# Patient Record
Sex: MALE | Race: WHITE | NOT HISPANIC OR LATINO | URBAN - METROPOLITAN AREA
[De-identification: names, ages, dates, MRNs, and addresses within clinical notes are randomized per-mention and may not be internally consistent; named-entity substitution may affect disease eponyms.]

---

## 2020-06-17 ENCOUNTER — OFFICE VISIT (OUTPATIENT)
Dept: URBAN - METROPOLITAN AREA CLINIC 35 | Facility: CLINIC | Age: 45
End: 2020-06-17

## 2020-06-17 ENCOUNTER — TELEPHONE ENCOUNTER (OUTPATIENT)
Dept: URBAN - METROPOLITAN AREA CLINIC 35 | Facility: CLINIC | Age: 45
End: 2020-06-17

## 2020-06-17 VITALS
DIASTOLIC BLOOD PRESSURE: 80 MMHG | SYSTOLIC BLOOD PRESSURE: 106 MMHG | WEIGHT: 175 LBS | TEMPERATURE: 98.6 F | BODY MASS INDEX: 23.19 KG/M2 | HEIGHT: 73 IN

## 2020-06-17 RX ORDER — INSULIN HUMAN 100 [IU]/ML
AS DIRECTED INJECTION, SUSPENSION SUBCUTANEOUS
Status: ACTIVE | COMMUNITY

## 2020-06-17 RX ORDER — FAMOTIDINE 20 MG/1
1 TABLET AT BEDTIME AS NEEDED TABLET, FILM COATED ORAL ONCE A DAY
Qty: 90 TABLET | Refills: 1 | Status: ACTIVE | COMMUNITY
Start: 2020-05-20

## 2020-06-17 RX ORDER — FAMOTIDINE 40 MG/1
1 TABLET AT BEDTIME TABLET, FILM COATED ORAL ONCE A DAY
Qty: 30 | Status: DISCONTINUED | COMMUNITY

## 2020-06-17 RX ORDER — ESOMEPRAZOLE MAGNESIUM 20 MG/1
1 CAPSULE CAPSULE, DELAYED RELEASE ORAL ONCE A DAY
Status: ACTIVE | COMMUNITY

## 2020-06-17 RX ORDER — SUCRALFATE 1 G/1
1 TABLET ON AN EMPTY STOMACH TABLET ORAL TWICE A DAY
Qty: 60 | Status: DISCONTINUED | COMMUNITY

## 2020-06-17 RX ORDER — ESOMEPRAZOLE MAGNESIUM 20 MG/1
1 PACKET 1 HOUR BEFORE A MEAL MIXED WITH 15 ML OF WATER GRANULE, DELAYED RELEASE ORAL ONCE A DAY
Qty: 90 | Refills: 1 | Status: DISCONTINUED | COMMUNITY
Start: 2020-05-20

## 2020-06-17 RX ORDER — LISINOPRIL 5 MG/1
1 TABLET TABLET ORAL ONCE A DAY
Qty: 30 | Status: ACTIVE | COMMUNITY

## 2020-06-17 RX ORDER — ESOMEPRAZOLE MAGNESIUM 20 MG/1
1 CAPSULE CAPSULE, DELAYED RELEASE ORAL ONCE A DAY
Qty: 30 | Refills: 2 | OUTPATIENT
Start: 2020-06-17

## 2020-06-17 NOTE — HPI-MIGRATED HPI
;     Heartburn :         Patient presents for follow up. patient admits continuing Nexium 20mg , Pepcid 20mg as well as OTC FD Guard with decent control of symptoms . He admits a few breakthorugh episodes since last visit . Patient denies nausea or vomiting . He denies abdominal pain . He admits overall improvement since last visit.     Of Last visit (05/20/2020) patient presents for heartburn consult. Patient states he has been experiencing symptoms for several months. Patient admits currently taking Nexium 40 mg,  Pepcid 40 mg, as well as Sucralfate 1 gm daily.  He reports this medication will reduce symptoms but they are beginning to get worse.   Patient describes symptoms as a burning and gnawing sensation located in his stomach, he admits that the symptoms will last throughout the entire day. He denies symptoms awake at night. He admits that symptoms are worse in the evening.    Patient admits nausea without vomiting. Patient denies any associated weight loss. Patient admits an EGD/Colonoscopy within the last 2 yrs with AGA. He states he had colon polyps and esophagitis. He was told to repeat in 3-5 years.  Patient reports he had a GES completed at [place] with normal results. ;

## 2020-07-21 ENCOUNTER — OFFICE VISIT (OUTPATIENT)
Dept: URBAN - METROPOLITAN AREA CLINIC 35 | Facility: CLINIC | Age: 45
End: 2020-07-21

## 2020-07-24 ENCOUNTER — OFFICE VISIT (OUTPATIENT)
Dept: URBAN - METROPOLITAN AREA CLINIC 35 | Facility: CLINIC | Age: 45
End: 2020-07-24

## 2020-07-24 VITALS
TEMPERATURE: 98.1 F | DIASTOLIC BLOOD PRESSURE: 68 MMHG | HEIGHT: 73 IN | SYSTOLIC BLOOD PRESSURE: 112 MMHG | WEIGHT: 180 LBS | BODY MASS INDEX: 23.86 KG/M2

## 2020-07-24 RX ORDER — ESOMEPRAZOLE MAGNESIUM 20 MG/1
1 CAPSULE CAPSULE, DELAYED RELEASE ORAL ONCE A DAY
Qty: 30 | Refills: 2 | Status: ACTIVE | COMMUNITY
Start: 2020-06-17

## 2020-07-24 RX ORDER — LISINOPRIL 5 MG/1
1 TABLET TABLET ORAL ONCE A DAY
Qty: 30 | Status: ACTIVE | COMMUNITY

## 2020-07-24 RX ORDER — CARAWAY OIL/LEVOMENTHOL 25-20.75MG
AS DIRECTED CAPSULE ORAL
Status: ACTIVE | COMMUNITY

## 2020-07-24 RX ORDER — ESOMEPRAZOLE MAGNESIUM 20 MG/1
1 CAPSULE CAPSULE, DELAYED RELEASE ORAL ONCE A DAY
Status: DISCONTINUED | COMMUNITY

## 2020-07-24 RX ORDER — INSULIN HUMAN 100 [IU]/ML
AS DIRECTED INJECTION, SUSPENSION SUBCUTANEOUS
Status: ACTIVE | COMMUNITY

## 2020-07-24 RX ORDER — FAMOTIDINE 20 MG/1
1 TABLET AT BEDTIME AS NEEDED TABLET, FILM COATED ORAL ONCE A DAY
Qty: 90 TABLET | Refills: 1 | Status: ACTIVE | COMMUNITY
Start: 2020-05-20

## 2020-07-24 NOTE — HPI-MIGRATED HPI
;     Heartburn :             Patient presents for follow up. Patient admits any improvement since last visit . Patient admits continuing Nexium 20mg along with pepcid and FD guard . He admits some breakthrough episodes , he definitely notices a difference with beef .  He denies nausea or vomiting .    Of last visit (06/17/2020) Patient presents for follow up. patient admits continuing Nexium 20mg , Pepcid 20mg as well as OTC FD Guard with decent control of symptoms . He admits a few breakthorugh episodes since last visit . Patient denies nausea or vomiting . He denies abdominal pain . He admits overall improvement since last visit.     Of Last visit (05/20/2020) patient presents for heartburn consult. Patient states he has been experiencing symptoms for several months. Patient admits currently taking Nexium 40 mg,  Pepcid 40 mg, as well as Sucralfate 1 gm daily.  He reports this medication will reduce symptoms but they are beginning to get worse.   Patient describes symptoms as a burning and gnawing sensation located in his stomach, he admits that the symptoms will last throughout the entire day. He denies symptoms awake at night. He admits that symptoms are worse in the evening.    Patient admits nausea without vomiting. Patient denies any associated weight loss. Patient admits an EGD/Colonoscopy within the last 2 yrs with AGA. He states he had colon polyps and esophagitis. He was told to repeat in 3-5 years.  Patient reports he had a GES ( gastric emptying study )  completed at [place] with normal results. ;

## 2020-07-24 NOTE — EXAM-MIGRATED EXAMINATIONS
GENERAL APPEARANCE: - alert, in no acute distress, well developed, well nourished;   HEART: - no murmurs, regular rate and rhythm, S1, S2 normal;   LUNGS: - clear to auscultation bilaterally, good air movement, no wheezes, rales, rhonchi;   ABDOMEN: - bowel sounds present, no masses palpable, no organomegaly , no rebound tenderness, soft, nontender, nondistended;

## 2020-10-14 ENCOUNTER — WEB ENCOUNTER (OUTPATIENT)
Dept: URBAN - METROPOLITAN AREA CLINIC 35 | Facility: CLINIC | Age: 45
End: 2020-10-14

## 2020-10-23 ENCOUNTER — OFFICE VISIT (OUTPATIENT)
Dept: URBAN - METROPOLITAN AREA CLINIC 35 | Facility: CLINIC | Age: 45
End: 2020-10-23

## 2020-11-04 ENCOUNTER — OFFICE VISIT (OUTPATIENT)
Dept: URBAN - METROPOLITAN AREA CLINIC 35 | Facility: CLINIC | Age: 45
End: 2020-11-04

## 2020-11-04 VITALS
SYSTOLIC BLOOD PRESSURE: 110 MMHG | HEIGHT: 73 IN | WEIGHT: 176 LBS | TEMPERATURE: 98.1 F | DIASTOLIC BLOOD PRESSURE: 66 MMHG | BODY MASS INDEX: 23.33 KG/M2

## 2020-11-04 PROBLEM — 16331000 HEARTBURN: Status: ACTIVE | Noted: 2020-06-17

## 2020-11-04 PROBLEM — 10743008 IRRITABLE BOWEL SYNDROME: Status: ACTIVE | Noted: 2020-05-20

## 2020-11-04 RX ORDER — ESOMEPRAZOLE MAGNESIUM 20 MG/1
1 CAPSULE CAPSULE, DELAYED RELEASE ORAL ONCE A DAY
Qty: 90 CAPSULE | Refills: 1 | OUTPATIENT
Start: 2020-11-04

## 2020-11-04 RX ORDER — LISINOPRIL 5 MG/1
1 TABLET TABLET ORAL ONCE A DAY
Qty: 30 | Status: ACTIVE | COMMUNITY

## 2020-11-04 RX ORDER — INSULIN HUMAN 100 [IU]/ML
AS DIRECTED INJECTION, SUSPENSION SUBCUTANEOUS
Status: ACTIVE | COMMUNITY

## 2020-11-04 RX ORDER — CARAWAY OIL/LEVOMENTHOL 25-20.75MG
AS DIRECTED CAPSULE ORAL
Status: ON HOLD | COMMUNITY

## 2020-11-04 RX ORDER — AMITRIPTYLINE HYDROCHLORIDE 10 MG/1
1 TABLET AT BEDTIME TABLET, FILM COATED ORAL ONCE A DAY
Qty: 30 | Refills: 1 | OUTPATIENT
Start: 2020-11-04

## 2020-11-04 RX ORDER — FAMOTIDINE 20 MG/1
1 TABLET AT BEDTIME AS NEEDED TABLET, FILM COATED ORAL ONCE A DAY
Qty: 90 TABLET | Refills: 1 | Status: ACTIVE | COMMUNITY
Start: 2020-05-20

## 2020-11-04 RX ORDER — ESOMEPRAZOLE MAGNESIUM 20 MG/1
1 CAPSULE CAPSULE, DELAYED RELEASE ORAL ONCE A DAY
Qty: 30 | Refills: 2 | Status: ACTIVE | COMMUNITY
Start: 2020-06-17

## 2020-11-04 NOTE — HPI-MIGRATED HPI
;     Heartburn :   Patient present today for follow up of heartburn. Admits to occasional heartburn since his last visit. Patient admits continuing Nexium 20 mg along with Pepcid denies taking FD guard. He admits some breakthrough episodes , he definitely notices a difference with spicy red peppers . Denies vomiting, admits to occasional nausea if his blood pressure gets to low.  .  Admits to taking a OTC version of FD Guard at [facility].       Of last visit (07/ 24/2020)  Patient presents for follow up. Patient admits any improvement since last visit . Patient admits continuing Nexium 20 mg along with Pepcid and FD guard . He admits some breakthrough episodes , he definitely notices a difference with beef .  He denies nausea or vomiting .    Of last visit (06/17/2020) Patient presents for follow up. patient admits continuing Nexium 20 mg , Pepcid 20 mg as well as OTC FD Guard with decent control of symptoms . He admits a few breakthrough episodes since last visit . Patient denies nausea or vomiting . He denies abdominal pain . He admits overall improvement since last visit.     Of Last visit (05/20/2020) patient presents for heartburn consult. Patient states he has been experiencing symptoms for several months. Patient admits currently taking Nexium 40 mg,  Pepcid 40 mg, as well as Sucralfate 1 gm daily.  He reports this medication will reduce symptoms but they are beginning to get worse.   Patient describes symptoms as a burning and gnawing sensation located in his stomach, he admits that the symptoms will last throughout the entire day. He denies symptoms awake at night. He admits that symptoms are worse in the evening.    Patient admits nausea without vomiting. Patient denies any associated weight loss. Patient admits an EGD/Colonoscopy within the last 2 yrs with AGA. He states he had colon polyps and esophagitis. He was told to repeat in 3-5 years.  Patient reports he had a GES ( gastric emptying study )  completed at [place] with normal results. ;

## 2020-12-18 ENCOUNTER — OFFICE VISIT (OUTPATIENT)
Dept: URBAN - METROPOLITAN AREA CLINIC 35 | Facility: CLINIC | Age: 45
End: 2020-12-18

## 2021-01-21 ENCOUNTER — OFFICE VISIT (OUTPATIENT)
Dept: URBAN - METROPOLITAN AREA CLINIC 35 | Facility: CLINIC | Age: 46
End: 2021-01-21

## 2021-01-21 VITALS
DIASTOLIC BLOOD PRESSURE: 60 MMHG | HEIGHT: 73 IN | HEART RATE: 74 BPM | SYSTOLIC BLOOD PRESSURE: 100 MMHG | BODY MASS INDEX: 22.53 KG/M2 | WEIGHT: 170 LBS | OXYGEN SATURATION: 98 %

## 2021-01-21 RX ORDER — FAMOTIDINE 20 MG/1
1 TABLET AT BEDTIME AS NEEDED TABLET, FILM COATED ORAL ONCE A DAY
Qty: 90 TABLET | Refills: 1 | Status: ACTIVE | COMMUNITY
Start: 2020-05-20

## 2021-01-21 RX ORDER — AMITRIPTYLINE HYDROCHLORIDE 10 MG/1
1 TABLET AT BEDTIME TABLET, FILM COATED ORAL ONCE A DAY
Qty: 30 | Refills: 2 | OUTPATIENT
Start: 2021-01-21

## 2021-01-21 RX ORDER — CARAWAY OIL/LEVOMENTHOL 25-20.75MG
AS DIRECTED CAPSULE ORAL
Status: ON HOLD | COMMUNITY

## 2021-01-21 RX ORDER — INSULIN HUMAN 100 [IU]/ML
AS DIRECTED INJECTION, SUSPENSION SUBCUTANEOUS
Status: ACTIVE | COMMUNITY

## 2021-01-21 RX ORDER — ESOMEPRAZOLE MAGNESIUM 20 MG/1
1 CAPSULE CAPSULE, DELAYED RELEASE ORAL ONCE A DAY
Qty: 30 | Refills: 2 | Status: ON HOLD | COMMUNITY
Start: 2020-06-17

## 2021-01-21 RX ORDER — FAMOTIDINE 10 MG
1 TABLET TABLET ORAL ONCE A DAY
Qty: 90 TABLET | Refills: 2 | OUTPATIENT
Start: 2021-01-21

## 2021-01-21 RX ORDER — ESOMEPRAZOLE MAGNESIUM 20 MG/1
1 CAPSULE CAPSULE, DELAYED RELEASE ORAL ONCE A DAY
Qty: 90 CAPSULE | Refills: 1 | Status: ON HOLD | COMMUNITY
Start: 2020-11-04

## 2021-01-21 RX ORDER — AMITRIPTYLINE HYDROCHLORIDE 10 MG/1
1 TABLET AT BEDTIME TABLET, FILM COATED ORAL ONCE A DAY
Qty: 30 | Refills: 1 | Status: ACTIVE | COMMUNITY
Start: 2020-11-04

## 2021-01-21 RX ORDER — LISINOPRIL 5 MG/1
1 TABLET TABLET ORAL ONCE A DAY
Qty: 30 | Status: ACTIVE | COMMUNITY

## 2021-01-21 NOTE — HPI-MIGRATED HPI
;     Heartburn :   Patient present today for follow up of heartburn. Admits to occasional heartburn since his last visit. Patient denies continuing Nexium 20 mg along with Pepcid denies taking FD guard. He admits some breakthrough episodes, he definitely notices a difference with spicy red peppers. Denies vomiting, admits to occasional nausea if his blood pressure gets to low.  Admits to taking a OTC version of FD Guard at [facility].    Of last visit (07/ 24/2020)  Patient presents for follow up. Patient admits any improvement since last visit . Patient admits continuing Nexium 20 mg along with Pepcid and FD guard . He admits some breakthrough episodes , he definitely notices a difference with beef .  He denies nausea or vomiting .    Of last visit (06/17/2020) Patient presents for follow up. patient admits continuing Nexium 20 mg , Pepcid 20 mg as well as OTC FD Guard with decent control of symptoms . He admits a few breakthrough episodes since last visit . Patient denies nausea or vomiting . He denies abdominal pain . He admits overall improvement since last visit.     Of Last visit (05/20/2020) patient presents for heartburn consult. Patient states he has been experiencing symptoms for several months. Patient admits currently taking Nexium 40 mg,  Pepcid 40 mg, as well as Sucralfate 1 gm daily.  He reports this medication will reduce symptoms but they are beginning to get worse.   Patient describes symptoms as a burning and gnawing sensation located in his stomach, he admits that the symptoms will last throughout the entire day. He denies symptoms awake at night. He admits that symptoms are worse in the evening.    Patient admits nausea without vomiting. Patient denies any associated weight loss. Patient admits an EGD/Colonoscopy within the last 2 yrs with AGA. He states he had colon polyps and esophagitis. He was told to repeat in 3-5 years.  Patient reports he had a GES ( gastric emptying study )  completed at [place] with normal results. ;

## 2021-01-21 NOTE — EXAM-MIGRATED EXAMINATIONS
GENERAL APPEARANCE: - alert, in no acute distress, well developed, well nourished;   HEAD: - normocephalic, atraumatic;   ORAL CAVITY: - mucosa moist;   THROAT: - clear;   ABDOMEN: - bowel sounds present, no masses palpable, no organomegaly , no rebound tenderness, soft, nontender, nondistended;

## 2021-02-22 ENCOUNTER — TELEPHONE ENCOUNTER (OUTPATIENT)
Dept: URBAN - METROPOLITAN AREA CLINIC 35 | Facility: CLINIC | Age: 46
End: 2021-02-22

## 2021-02-22 RX ORDER — AMITRIPTYLINE HYDROCHLORIDE 10 MG/1
1 TABLET AT BEDTIME TABLET, FILM COATED ORAL ONCE A DAY
Qty: 30 | Refills: 2 | OUTPATIENT
Start: 2021-01-21

## 2021-05-25 ENCOUNTER — TELEPHONE ENCOUNTER (OUTPATIENT)
Dept: URBAN - METROPOLITAN AREA CLINIC 35 | Facility: CLINIC | Age: 46
End: 2021-05-25

## 2021-05-25 RX ORDER — AMITRIPTYLINE HYDROCHLORIDE 10 MG/1
1 TABLET AT BEDTIME TABLET, FILM COATED ORAL ONCE A DAY
Qty: 30 | Refills: 2 | OUTPATIENT
Start: 2021-01-21

## 2021-07-21 ENCOUNTER — OFFICE VISIT (OUTPATIENT)
Dept: URBAN - METROPOLITAN AREA CLINIC 33 | Facility: CLINIC | Age: 46
End: 2021-07-21

## 2021-07-21 VITALS
HEART RATE: 78 BPM | WEIGHT: 175 LBS | OXYGEN SATURATION: 98 % | SYSTOLIC BLOOD PRESSURE: 118 MMHG | DIASTOLIC BLOOD PRESSURE: 78 MMHG | BODY MASS INDEX: 23.19 KG/M2 | HEIGHT: 73 IN

## 2021-07-21 RX ORDER — ESOMEPRAZOLE MAGNESIUM 20 MG/1
1 CAPSULE CAPSULE, DELAYED RELEASE ORAL ONCE A DAY
Qty: 90 CAPSULE | Refills: 1 | Status: ON HOLD | COMMUNITY
Start: 2020-11-04

## 2021-07-21 RX ORDER — FAMOTIDINE 10 MG/1
1 TABLET TABLET ORAL TWICE A DAY
Status: ACTIVE | COMMUNITY
Start: 2021-01-21

## 2021-07-21 RX ORDER — LISINOPRIL 5 MG/1
1 TABLET TABLET ORAL ONCE A DAY
Qty: 30 | Status: ACTIVE | COMMUNITY

## 2021-07-21 RX ORDER — ESOMEPRAZOLE MAGNESIUM 20 MG/1
1 CAPSULE CAPSULE, DELAYED RELEASE ORAL ONCE A DAY
Qty: 30 | Refills: 2 | Status: ON HOLD | COMMUNITY
Start: 2020-06-17

## 2021-07-21 RX ORDER — FAMOTIDINE 20 MG/1
1 TABLET AT BEDTIME AS NEEDED TABLET, FILM COATED ORAL ONCE A DAY
Qty: 90 TABLET | Refills: 1 | Status: DISCONTINUED | COMMUNITY
Start: 2020-05-20

## 2021-07-21 RX ORDER — CARAWAY OIL/LEVOMENTHOL 25-20.75MG
AS DIRECTED CAPSULE ORAL
Status: ON HOLD | COMMUNITY

## 2021-07-21 RX ORDER — AMITRIPTYLINE HYDROCHLORIDE 10 MG/1
1 TABLET AT BEDTIME TABLET, FILM COATED ORAL ONCE A DAY
Qty: 30 | Refills: 1 | Status: DISCONTINUED | COMMUNITY
Start: 2020-11-04

## 2021-07-21 RX ORDER — INSULIN HUMAN 100 [IU]/ML
AS DIRECTED INJECTION, SUSPENSION SUBCUTANEOUS
Status: ACTIVE | COMMUNITY

## 2021-07-21 RX ORDER — FAMOTIDINE 10 MG/1
1 TABLET TABLET, FILM COATED ORAL TWICE A DAY
Qty: 60 TABLET | Refills: 5 | OUTPATIENT
Start: 2021-01-21

## 2021-07-21 RX ORDER — AMITRIPTYLINE HYDROCHLORIDE 10 MG/1
1 TABLET AT BEDTIME TABLET, FILM COATED ORAL ONCE A DAY
Qty: 30 | Refills: 2 | Status: ACTIVE | COMMUNITY
Start: 2021-01-21

## 2021-07-21 RX ORDER — AMITRIPTYLINE HYDROCHLORIDE 10 MG/1
1 TABLET AT BEDTIME TABLET, FILM COATED ORAL ONCE A DAY
Qty: 30 TABLET | Refills: 5 | OUTPATIENT
Start: 2021-01-21

## 2021-07-21 NOTE — HPI-MIGRATED HPI
;     Heartburn : Patient presents for follow up of heartburn . He admits  continuing Famotidine 10mg BID and Amitriptyline 10mg qhs  since last visit . He denies any current symptoms     Of last visit (01/21/2021) Patient present today for follow up of heartburn. Admits to occasional heartburn since his last visit. Patient denies continuing Nexium 20 mg along with Pepcid denies taking FD guard. He admits some breakthrough episodes, he definitely notices a difference with spicy red peppers. Denies vomiting, admits to occasional nausea if his blood pressure gets to low.  Admits to taking a OTC version of FD Guard at [facility].    Of last visit (07/ 24/2020)  Patient presents for follow up. Patient admits any improvement since last visit . Patient admits continuing Nexium 20 mg along with Pepcid and FD guard . He admits some breakthrough episodes , he definitely notices a difference with beef .  He denies nausea or vomiting .    Of last visit (06/17/2020) Patient presents for follow up. patient admits continuing Nexium 20 mg , Pepcid 20 mg as well as OTC FD Guard with decent control of symptoms . He admits a few breakthrough episodes since last visit . Patient denies nausea or vomiting . He denies abdominal pain . He admits overall improvement since last visit.     Of Last visit (05/20/2020) patient presents for heartburn consult. Patient states he has been experiencing symptoms for several months. Patient admits currently taking Nexium 40 mg,  Pepcid 40 mg, as well as Sucralfate 1 gm daily.  He reports this medication will reduce symptoms but they are beginning to get worse.   Patient describes symptoms as a burning and gnawing sensation located in his stomach, he admits that the symptoms will last throughout the entire day. He denies symptoms awake at night. He admits that symptoms are worse in the evening.    Patient admits nausea without vomiting. Patient denies any associated weight loss. Patient admits an EGD/Colonoscopy within the last 2 yrs with AGA. He states he had colon polyps and esophagitis. He was told to repeat in 3-5 years.  Patient reports he had a GES ( gastric emptying study )  completed at [place] with normal results.;

## 2022-01-20 ENCOUNTER — TELEPHONE ENCOUNTER (OUTPATIENT)
Dept: URBAN - METROPOLITAN AREA CLINIC 36 | Facility: CLINIC | Age: 47
End: 2022-01-20

## 2022-01-20 ENCOUNTER — OFFICE VISIT (OUTPATIENT)
Dept: URBAN - METROPOLITAN AREA CLINIC 35 | Facility: CLINIC | Age: 47
End: 2022-01-20
Payer: COMMERCIAL

## 2022-01-20 VITALS
DIASTOLIC BLOOD PRESSURE: 84 MMHG | OXYGEN SATURATION: 97 % | HEART RATE: 92 BPM | BODY MASS INDEX: 24.12 KG/M2 | HEIGHT: 73 IN | WEIGHT: 182 LBS | SYSTOLIC BLOOD PRESSURE: 128 MMHG

## 2022-01-20 DIAGNOSIS — F43.0 ACUTE STRESS REACTION: ICD-10-CM

## 2022-01-20 DIAGNOSIS — K30 FUNCTIONAL DYSPEPSIA: ICD-10-CM

## 2022-01-20 DIAGNOSIS — Z12.11 ENCOUNTER FOR SCREENING FOR MALIGNANT NEOPLASM OF COLON: ICD-10-CM

## 2022-01-20 DIAGNOSIS — K21.9 GERD: ICD-10-CM

## 2022-01-20 PROBLEM — 67195008 ACUTE STRESS REACTION: Status: ACTIVE | Noted: 2020-11-04

## 2022-01-20 PROCEDURE — 99213 OFFICE O/P EST LOW 20 MIN: CPT | Performed by: INTERNAL MEDICINE

## 2022-01-20 RX ORDER — AMITRIPTYLINE HYDROCHLORIDE 10 MG/1
1 TABLET AT BEDTIME TABLET, FILM COATED ORAL ONCE A DAY
Qty: 30 TABLET | Refills: 5 | Status: ACTIVE | COMMUNITY
Start: 2021-01-21

## 2022-01-20 RX ORDER — INSULIN HUMAN 100 [IU]/ML
AS DIRECTED INJECTION, SUSPENSION SUBCUTANEOUS
Status: ACTIVE | COMMUNITY

## 2022-01-20 RX ORDER — LISINOPRIL 5 MG/1
1 TABLET TABLET ORAL ONCE A DAY
Qty: 30 | Status: ACTIVE | COMMUNITY

## 2022-01-20 RX ORDER — CARAWAY OIL/LEVOMENTHOL 25-20.75MG
AS DIRECTED CAPSULE ORAL
Status: ON HOLD | COMMUNITY

## 2022-01-20 RX ORDER — ESOMEPRAZOLE MAGNESIUM 20 MG/1
1 CAPSULE CAPSULE, DELAYED RELEASE ORAL ONCE A DAY
Qty: 90 CAPSULE | Refills: 1 | Status: ON HOLD | COMMUNITY
Start: 2020-11-04

## 2022-01-20 RX ORDER — AMITRIPTYLINE HYDROCHLORIDE 10 MG/1
1 TABLET AT BEDTIME TABLET, FILM COATED ORAL ONCE A DAY
Qty: 90 TABLET | Refills: 1
Start: 2021-01-21

## 2022-01-20 RX ORDER — FAMOTIDINE 10 MG/1
1 TABLET TABLET, FILM COATED ORAL TWICE A DAY
Qty: 60 TABLET | Refills: 5 | Status: ACTIVE | COMMUNITY
Start: 2021-01-21

## 2022-01-20 NOTE — HPI-HEARTBURN
Patient presents for follow up of heartburn . he admits /denies improvement since last visit . He admits continuing Famotidine 10mg BID and Amitriptyline 10mg QHS . Patient states that he seldom has heartburn and states that he believes it is diet related.  Patient states that the Amitriptyline has been helping with his sleep and anxiety. He is not sure if it has helped with his bowel habits.  Of last visit (07/21/21) Patient presents for follow up of heartburn . He admits  continuing Famotidine 10mg BID and Amitriptyline 10mg qhs  since last visit . He denies any current symptoms     Of last visit (01/21/2021) Patient present today for follow up of heartburn. Admits to occasional heartburn since his last visit. Patient denies continuing Nexium 20 mg along with Pepcid denies taking FD guard. He admits some breakthrough episodes, he definitely notices a difference with spicy red peppers. Denies vomiting, admits to occasional nausea if his blood pressure gets to low.  Admits to taking a OTC version of FD Guard at [facility].    Of last visit (07/ 24/2020)  Patient presents for follow up. Patient admits any improvement since last visit . Patient admits continuing Nexium 20 mg along with Pepcid and FD guard . He admits some breakthrough episodes , he definitely notices a difference with beef .  He denies nausea or vomiting .    Of last visit (06/17/2020) Patient presents for follow up. patient admits continuing Nexium 20 mg , Pepcid 20 mg as well as OTC FD Guard with decent control of symptoms . He admits a few breakthrough episodes since last visit . Patient denies nausea or vomiting . He denies abdominal pain . He admits overall improvement since last visit.     Of Last visit (05/20/2020) patient presents for heartburn consult. Patient states he has been experiencing symptoms for several months. Patient admits currently taking Nexium 40 mg,  Pepcid 40 mg, as well as Sucralfate 1 gm daily.  He reports this medication will reduce symptoms but they are beginning to get worse.   Patient describes symptoms as a burning and gnawing sensation located in his stomach, he admits that the symptoms will last throughout the entire day. He denies symptoms awake at night. He admits that symptoms are worse in the evening.    Patient admits nausea without vomiting. Patient denies any associated weight loss. Patient admits an EGD/Colonoscopy within the last 2 yrs with AGA. He states he had colon polyps and esophagitis. He was told to repeat in 3-5 years.  Patient reports he had a GES ( gastric emptying study )  completed at [place] with normal results.;

## 2022-07-21 ENCOUNTER — LAB OUTSIDE AN ENCOUNTER (OUTPATIENT)
Dept: URBAN - METROPOLITAN AREA CLINIC 35 | Facility: CLINIC | Age: 47
End: 2022-07-21

## 2022-07-21 ENCOUNTER — OFFICE VISIT (OUTPATIENT)
Dept: URBAN - METROPOLITAN AREA CLINIC 35 | Facility: CLINIC | Age: 47
End: 2022-07-21
Payer: COMMERCIAL

## 2022-07-21 VITALS
OXYGEN SATURATION: 96 % | HEART RATE: 102 BPM | HEIGHT: 73 IN | SYSTOLIC BLOOD PRESSURE: 124 MMHG | DIASTOLIC BLOOD PRESSURE: 78 MMHG | BODY MASS INDEX: 23.86 KG/M2 | WEIGHT: 180 LBS

## 2022-07-21 DIAGNOSIS — Z86.010 PERSONAL HISTORY OF COLONIC POLYPS: ICD-10-CM

## 2022-07-21 DIAGNOSIS — K30 FUNCTIONAL DYSPEPSIA: ICD-10-CM

## 2022-07-21 PROCEDURE — 99214 OFFICE O/P EST MOD 30 MIN: CPT | Performed by: INTERNAL MEDICINE

## 2022-07-21 RX ORDER — SODIUM PICOSULFATE, MAGNESIUM OXIDE, AND ANHYDROUS CITRIC ACID 10; 3.5; 12 MG/160ML; G/160ML; G/160ML
160 ML LIQUID ORAL
Qty: 320 MILLILITER | Refills: 0 | OUTPATIENT
Start: 2022-07-21 | End: 2022-07-23

## 2022-07-21 RX ORDER — LISINOPRIL 5 MG/1
1 TABLET TABLET ORAL ONCE A DAY
Qty: 30 | Status: ACTIVE | COMMUNITY

## 2022-07-21 RX ORDER — CARAWAY OIL/LEVOMENTHOL 25-20.75MG
AS DIRECTED CAPSULE ORAL
Status: ON HOLD | COMMUNITY

## 2022-07-21 RX ORDER — INSULIN HUMAN 100 [IU]/ML
AS DIRECTED INJECTION, SUSPENSION SUBCUTANEOUS
Status: ACTIVE | COMMUNITY

## 2022-07-21 RX ORDER — FAMOTIDINE 10 MG/1
1 TABLET TABLET, FILM COATED ORAL TWICE A DAY
Qty: 60 TABLET | Refills: 5 | Status: ACTIVE | COMMUNITY
Start: 2021-01-21

## 2022-07-21 RX ORDER — AMITRIPTYLINE HYDROCHLORIDE 10 MG/1
1 TABLET AT BEDTIME TABLET, FILM COATED ORAL ONCE A DAY
Qty: 90 TABLET | Refills: 1 | Status: ACTIVE | COMMUNITY
Start: 2021-01-21

## 2022-07-21 RX ORDER — ESOMEPRAZOLE MAGNESIUM 20 MG/1
1 CAPSULE CAPSULE, DELAYED RELEASE ORAL ONCE A DAY
Qty: 90 CAPSULE | Refills: 1 | Status: ON HOLD | COMMUNITY
Start: 2020-11-04

## 2022-07-21 NOTE — HPI-HEARTBURN
46 year old patient presents for follow up of heartburn. He admits symptoms improved since last visit. He admits continuing Famotidine 10 mg and Amitriptyline 10 mg QHS with relief of symptoms. He denies nausea or vomiting.   Of last visit (01/20/22) Patient presents for follow up of heartburn . he admits /denies improvement since last visit . He admits continuing Famotidine 10mg BID and Amitriptyline 10mg QHS . Patient states that he seldom has heartburn and states that he believes it is diet related.  Patient states that the Amitriptyline has been helping with his sleep and anxiety. He is not sure if it has helped with his bowel habits.  Of last visit (07/21/21) Patient presents for follow up of heartburn . He admits  continuing Famotidine 10mg BID and Amitriptyline 10mg qhs  since last visit . He denies any current symptoms    Of last visit (01/21/2021) Patient present today for follow up of heartburn. Admits to occasional heartburn since his last visit. Patient denies continuing Nexium 20 mg along with Pepcid denies taking FD guard. He admits some breakthrough episodes, he definitely notices a difference with spicy red peppers. Denies vomiting, admits to occasional nausea if his blood pressure gets to low.  Admits to taking a OTC version of FD Guard at [facility].    Of last visit (07/ 24/2020)  Patient presents for follow up. Patient admits any improvement since last visit . Patient admits continuing Nexium 20 mg along with Pepcid and FD guard . He admits some breakthrough episodes , he definitely notices a difference with beef .  He denies nausea or vomiting .    Of last visit (06/17/2020) Patient presents for follow up. patient admits continuing Nexium 20 mg , Pepcid 20 mg as well as OTC FD Guard with decent control of symptoms . He admits a few breakthrough episodes since last visit . Patient denies nausea or vomiting . He denies abdominal pain . He admits overall improvement since last visit.     Of Last visit (05/20/2020) patient presents for heartburn consult. Patient states he has been experiencing symptoms for several months. Patient admits currently taking Nexium 40 mg,  Pepcid 40 mg, as well as Sucralfate 1 gm daily.  He reports this medication will reduce symptoms but they are beginning to get worse.   Patient describes symptoms as a burning and gnawing sensation located in his stomach, he admits that the symptoms will last throughout the entire day. He denies symptoms awake at night. He admits that symptoms are worse in the evening.    Patient admits nausea without vomiting. Patient denies any associated weight loss. Patient admits an EGD/Colonoscopy within the last 2 yrs with AGA. He states he had colon polyps and esophagitis. He was told to repeat in 3-5 years.  Patient reports he had a GES ( gastric emptying study )  completed at [place] with normal results.;

## 2022-08-30 PROBLEM — 3696007 FUNCTIONAL DYSPEPSIA: Status: ACTIVE | Noted: 2020-05-20

## 2022-08-30 PROBLEM — 428283002: Status: ACTIVE | Noted: 2022-07-21

## 2022-09-15 ENCOUNTER — WEB ENCOUNTER (OUTPATIENT)
Dept: URBAN - METROPOLITAN AREA CLINIC 35 | Facility: CLINIC | Age: 47
End: 2022-09-15

## 2022-09-15 RX ORDER — AMITRIPTYLINE HYDROCHLORIDE 10 MG/1
1 TABLET AT BEDTIME TABLET, FILM COATED ORAL ONCE A DAY
Qty: 30 TABLET | Refills: 2
Start: 2021-01-21

## 2022-10-04 ENCOUNTER — OFFICE VISIT (OUTPATIENT)
Dept: URBAN - METROPOLITAN AREA SURGERY CENTER 8 | Facility: SURGERY CENTER | Age: 47
End: 2022-10-04
Payer: COMMERCIAL

## 2022-10-04 ENCOUNTER — CLAIMS CREATED FROM THE CLAIM WINDOW (OUTPATIENT)
Dept: URBAN - METROPOLITAN AREA CLINIC 4 | Facility: CLINIC | Age: 47
End: 2022-10-04
Payer: COMMERCIAL

## 2022-10-04 DIAGNOSIS — K21.9 GASTRO-ESOPHAGEAL REFLUX DISEASE WITHOUT ESOPHAGITIS: ICD-10-CM

## 2022-10-04 DIAGNOSIS — K22.89 DILATATION OF ESOPHAGUS: ICD-10-CM

## 2022-10-04 DIAGNOSIS — R10.13 ABDOMINAL DISCOMFORT, EPIGASTRIC: ICD-10-CM

## 2022-10-04 DIAGNOSIS — K22.89 OTHER SPECIFIED DISEASE OF ESOPHAGUS: ICD-10-CM

## 2022-10-04 DIAGNOSIS — Z86.010 ADENOMAS PERSONAL HISTORY OF COLONIC POLYPS: ICD-10-CM

## 2022-10-04 DIAGNOSIS — K21.9 ACID REFLUX: ICD-10-CM

## 2022-10-04 DIAGNOSIS — D12.3 ADENOMA OF TRANSVERSE COLON: ICD-10-CM

## 2022-10-04 DIAGNOSIS — K29.70 GASTRITIS, UNSPECIFIED, WITHOUT BLEEDING: ICD-10-CM

## 2022-10-04 DIAGNOSIS — K31.89 OTHER DISEASES OF STOMACH AND DUODENUM: ICD-10-CM

## 2022-10-04 DIAGNOSIS — K31.89 ACQUIRED DEFORMITY OF DUODENUM: ICD-10-CM

## 2022-10-04 DIAGNOSIS — D12.3 BENIGN NEOPLASM OF TRANSVERSE COLON: ICD-10-CM

## 2022-10-04 PROCEDURE — 43239 EGD BIOPSY SINGLE/MULTIPLE: CPT | Performed by: INTERNAL MEDICINE

## 2022-10-04 PROCEDURE — 45385 COLONOSCOPY W/LESION REMOVAL: CPT | Performed by: INTERNAL MEDICINE

## 2022-10-04 PROCEDURE — G8907 PT DOC NO EVENTS ON DISCHARG: HCPCS | Performed by: INTERNAL MEDICINE

## 2022-10-04 PROCEDURE — 88305 TISSUE EXAM BY PATHOLOGIST: CPT | Performed by: PATHOLOGY

## 2022-10-04 PROCEDURE — 88312 SPECIAL STAINS GROUP 1: CPT | Performed by: PATHOLOGY

## 2022-10-25 ENCOUNTER — OFFICE VISIT (OUTPATIENT)
Dept: URBAN - METROPOLITAN AREA CLINIC 35 | Facility: CLINIC | Age: 47
End: 2022-10-25
Payer: COMMERCIAL

## 2022-10-25 VITALS
HEIGHT: 73 IN | BODY MASS INDEX: 24.52 KG/M2 | OXYGEN SATURATION: 97 % | DIASTOLIC BLOOD PRESSURE: 74 MMHG | WEIGHT: 185 LBS | SYSTOLIC BLOOD PRESSURE: 112 MMHG | HEART RATE: 77 BPM

## 2022-10-25 DIAGNOSIS — R12 HEARTBURN: ICD-10-CM

## 2022-10-25 DIAGNOSIS — Z86.010 PERSONAL HISTORY OF COLONIC POLYPS: ICD-10-CM

## 2022-10-25 DIAGNOSIS — K30 FUNCTIONAL DYSPEPSIA: ICD-10-CM

## 2022-10-25 PROCEDURE — 99214 OFFICE O/P EST MOD 30 MIN: CPT | Performed by: INTERNAL MEDICINE

## 2022-10-25 RX ORDER — INSULIN HUMAN 100 [IU]/ML
AS DIRECTED INJECTION, SUSPENSION SUBCUTANEOUS
Status: ACTIVE | COMMUNITY

## 2022-10-25 RX ORDER — FAMOTIDINE 10 MG/1
1 TABLET TABLET, FILM COATED ORAL TWICE A DAY
Qty: 60 TABLET | Refills: 5 | Status: ACTIVE | COMMUNITY
Start: 2021-01-21

## 2022-10-25 RX ORDER — LISINOPRIL 5 MG/1
1 TABLET TABLET ORAL ONCE A DAY
Qty: 30 | Status: ACTIVE | COMMUNITY

## 2022-10-25 RX ORDER — ESOMEPRAZOLE MAGNESIUM 20 MG/1
1 CAPSULE CAPSULE, DELAYED RELEASE ORAL ONCE A DAY
Qty: 90 CAPSULE | Refills: 1 | Status: ON HOLD | COMMUNITY
Start: 2020-11-04

## 2022-10-25 RX ORDER — AMITRIPTYLINE HYDROCHLORIDE 10 MG/1
1 TABLET AT BEDTIME TABLET, FILM COATED ORAL ONCE A DAY
Qty: 30 TABLET | Refills: 2 | Status: ACTIVE | COMMUNITY
Start: 2021-01-21

## 2022-10-25 RX ORDER — AMITRIPTYLINE HYDROCHLORIDE 10 MG/1
1 TABLET AT BEDTIME TABLET, FILM COATED ORAL ONCE A DAY
Qty: 90 | Refills: 1 | OUTPATIENT
Start: 2022-10-25

## 2022-10-25 RX ORDER — CARAWAY OIL/LEVOMENTHOL 25-20.75MG
AS DIRECTED CAPSULE ORAL
Status: ON HOLD | COMMUNITY

## 2022-10-25 NOTE — HPI-COLONOSCOPY FOLLOWUP
Patient presents today for colonoscopy follow up. Patient had colonoscopy completed on 10/04/2022, with results noted below. Patient denies any complications after procedure.  Patient currently admits normal bowel habits. Patient denies rectal bleeding, melena, or mucus

## 2022-10-25 NOTE — HPI-HEARTBURN
Patient presents for follow up of EGD and Heartburn. Patient admits he has continued to take Famotidine and Amitriptyline for management of symptoms . He had EGD completed on 10/04 which he denies any complications . Patient denies any breakthrough episodes .     Of last visit (07/21/2022) 46 year old patient presents for follow up of heartburn. He admits symptoms improved since last visit. He admits continuing Famotidine 10 mg and Amitriptyline 10 mg QHS with relief of symptoms. He denies nausea or vomiting.   Of last visit (01/20/22) Patient presents for follow up of heartburn . he admits /denies improvement since last visit . He admits continuing Famotidine 10mg BID and Amitriptyline 10mg QHS . Patient states that he seldom has heartburn and states that he believes it is diet related.  Patient states that the Amitriptyline has been helping with his sleep and anxiety. He is not sure if it has helped with his bowel habits.  Of last visit (07/21/21) Patient presents for follow up of heartburn . He admits  continuing Famotidine 10mg BID and Amitriptyline 10mg qhs  since last visit . He denies any current symptoms    Of last visit (01/21/2021) Patient present today for follow up of heartburn. Admits to occasional heartburn since his last visit. Patient denies continuing Nexium 20 mg along with Pepcid denies taking FD guard. He admits some breakthrough episodes, he definitely notices a difference with spicy red peppers. Denies vomiting, admits to occasional nausea if his blood pressure gets to low.  Admits to taking a OTC version of FD Guard at [facility].    Of last visit (07/ 24/2020)  Patient presents for follow up. Patient admits any improvement since last visit . Patient admits continuing Nexium 20 mg along with Pepcid and FD guard . He admits some breakthrough episodes , he definitely notices a difference with beef .  He denies nausea or vomiting .    Of last visit (06/17/2020) Patient presents for follow up. patient admits continuing Nexium 20 mg , Pepcid 20 mg as well as OTC FD Guard with decent control of symptoms . He admits a few breakthrough episodes since last visit . Patient denies nausea or vomiting . He denies abdominal pain . He admits overall improvement since last visit.     Of Last visit (05/20/2020) patient presents for heartburn consult. Patient states he has been experiencing symptoms for several months. Patient admits currently taking Nexium 40 mg,  Pepcid 40 mg, as well as Sucralfate 1 gm daily.  He reports this medication will reduce symptoms but they are beginning to get worse.   Patient describes symptoms as a burning and gnawing sensation located in his stomach, he admits that the symptoms will last throughout the entire day. He denies symptoms awake at night. He admits that symptoms are worse in the evening.    Patient admits nausea without vomiting. Patient denies any associated weight loss. Patient admits an EGD/Colonoscopy within the last 2 yrs with AGA. He states he had colon polyps and esophagitis. He was told to repeat in 3-5 years.  Patient reports he had a GES ( gastric emptying study )  completed at [place] with normal results.;

## 2023-03-15 ENCOUNTER — TELEPHONE ENCOUNTER (OUTPATIENT)
Dept: URBAN - METROPOLITAN AREA CLINIC 36 | Facility: CLINIC | Age: 48
End: 2023-03-15

## 2023-03-15 RX ORDER — AMITRIPTYLINE HYDROCHLORIDE 10 MG/1
1 TABLET AT BEDTIME TABLET, FILM COATED ORAL ONCE A DAY
Qty: 90 | Refills: 0
Start: 2022-10-25

## 2023-04-25 ENCOUNTER — OFFICE VISIT (OUTPATIENT)
Dept: URBAN - METROPOLITAN AREA CLINIC 35 | Facility: CLINIC | Age: 48
End: 2023-04-25

## 2023-04-25 RX ORDER — AMITRIPTYLINE HYDROCHLORIDE 10 MG/1
1 TABLET AT BEDTIME TABLET, FILM COATED ORAL ONCE A DAY
Qty: 30 TABLET | Refills: 2 | Status: ACTIVE | COMMUNITY
Start: 2021-01-21

## 2023-04-25 RX ORDER — ESOMEPRAZOLE MAGNESIUM 20 MG/1
1 CAPSULE CAPSULE, DELAYED RELEASE ORAL ONCE A DAY
Qty: 90 CAPSULE | Refills: 1 | Status: ON HOLD | COMMUNITY
Start: 2020-11-04

## 2023-04-25 RX ORDER — FAMOTIDINE 10 MG/1
1 TABLET TABLET, FILM COATED ORAL TWICE A DAY
Qty: 60 TABLET | Refills: 5 | Status: ACTIVE | COMMUNITY
Start: 2021-01-21

## 2023-04-25 RX ORDER — AMITRIPTYLINE HYDROCHLORIDE 10 MG/1
1 TABLET AT BEDTIME TABLET, FILM COATED ORAL ONCE A DAY
Qty: 90 | Refills: 0 | Status: ACTIVE | COMMUNITY
Start: 2022-10-25

## 2023-04-25 RX ORDER — LISINOPRIL 5 MG/1
1 TABLET TABLET ORAL ONCE A DAY
Qty: 30 | Status: ACTIVE | COMMUNITY

## 2023-04-25 RX ORDER — CARAWAY OIL/LEVOMENTHOL 25-20.75MG
AS DIRECTED CAPSULE ORAL
Status: ON HOLD | COMMUNITY

## 2023-04-25 RX ORDER — INSULIN HUMAN 100 [IU]/ML
AS DIRECTED INJECTION, SUSPENSION SUBCUTANEOUS
Status: ACTIVE | COMMUNITY

## 2023-04-25 NOTE — HPI-HEARTBURN
Patient presents for follow up of heartburn. Patient admits/denies improvement of symptoms. Patient admits/denies the continuation of Famotidine and Amitriptyline for symptoms with/without relief. He admits/denies associated nausea/vomiting or dysphagia.   As of last visit (10/25/2022) Patient presents for follow up of EGD and Heartburn. Patient admits he has continued to take Famotidine and Amitriptyline for management of symptoms . He had EGD completed on 10/04 which he denies any complications . Patient denies any breakthrough episodes .     Of last visit (07/21/2022) 46 year old patient presents for follow up of heartburn. He admits symptoms improved since last visit. He admits continuing Famotidine 10 mg and Amitriptyline 10 mg QHS with relief of symptoms. He denies nausea or vomiting.   Of last visit (01/20/22) Patient presents for follow up of heartburn . he admits /denies improvement since last visit . He admits continuing Famotidine 10mg BID and Amitriptyline 10mg QHS . Patient states that he seldom has heartburn and states that he believes it is diet related.  Patient states that the Amitriptyline has been helping with his sleep and anxiety. He is not sure if it has helped with his bowel habits.  Of last visit (07/21/21) Patient presents for follow up of heartburn . He admits  continuing Famotidine 10mg BID and Amitriptyline 10mg qhs  since last visit . He denies any current symptoms    Of last visit (01/21/2021) Patient present today for follow up of heartburn. Admits to occasional heartburn since his last visit. Patient denies continuing Nexium 20 mg along with Pepcid denies taking FD guard. He admits some breakthrough episodes, he definitely notices a difference with spicy red peppers. Denies vomiting, admits to occasional nausea if his blood pressure gets to low.  Admits to taking a OTC version of FD Guard at [facility].    Of last visit (07/ 24/2020)  Patient presents for follow up. Patient admits any improvement since last visit . Patient admits continuing Nexium 20 mg along with Pepcid and FD guard . He admits some breakthrough episodes , he definitely notices a difference with beef .  He denies nausea or vomiting .    Of last visit (06/17/2020) Patient presents for follow up. patient admits continuing Nexium 20 mg , Pepcid 20 mg as well as OTC FD Guard with decent control of symptoms . He admits a few breakthrough episodes since last visit . Patient denies nausea or vomiting . He denies abdominal pain . He admits overall improvement since last visit.     Of Last visit (05/20/2020) patient presents for heartburn consult. Patient states he has been experiencing symptoms for several months. Patient admits currently taking Nexium 40 mg,  Pepcid 40 mg, as well as Sucralfate 1 gm daily.  He reports this medication will reduce symptoms but they are beginning to get worse.   Patient describes symptoms as a burning and gnawing sensation located in his stomach, he admits that the symptoms will last throughout the entire day. He denies symptoms awake at night. He admits that symptoms are worse in the evening.    Patient admits nausea without vomiting. Patient denies any associated weight loss. Patient admits an EGD/Colonoscopy within the last 2 yrs with AGA. He states he had colon polyps and esophagitis. He was told to repeat in 3-5 years.  Patient reports he had a GES ( gastric emptying study )  completed at [place] with normal results.;

## 2023-04-25 NOTE — HPI-HEARTBURN
Patient presents for follow up of heartburn. Patient admits/denies improvement of symptoms. Patient admits/denies the continuation of Famotidine and Amitriptyline for symptoms with/without relief. He admits/denies associated nausea/vomiting or dysphagia.   As of last visit (10/25/2022) Patient presents for follow up of EGD and Heartburn. Patient admits he has continued to take Famotidine and Amitriptyline for management of symptoms . He had EGD completed on 10/04 which he denies any complications . Patient denies any breakthrough episodes .     Of last visit (07/21/2022) 46 year old patient presents for follow up of heartburn. He admits symptoms improved since last visit. He admits continuing Famotidine 10 mg and Amitriptyline 10 mg QHS with relief of symptoms. He denies nausea or vomiting.   Of last visit (01/20/22) Patient presents for follow up of heartburn . he admits /denies improvement since last visit . He admits continuing Famotidine 10mg BID and Amitriptyline 10mg QHS . Patient states that he seldom has heartburn and states that he believes it is diet related.  Patient states that the Amitriptyline has been helping with his sleep and anxiety. He is not sure if it has helped with his bowel habits.  Of last visit (07/21/21) Patient presents for follow up of heartburn . He admits  continuing Famotidine 10mg BID and Amitriptyline 10mg qhs  since last visit . He denies any current symptoms    Of last visit (01/21/2021) Patient present today for follow up of heartburn. Admits to occasional heartburn since his last visit. Patient denies continuing Nexium 20 mg along with Pepcid denies taking FD guard. He admits some breakthrough episodes, he definitely notices a difference with spicy red peppers. Denies vomiting, admits to occasional nausea if his blood pressure gets to low.  Admits to taking a OTC version of FD Guard at [facility].    Of last visit (07/ 24/2020)  Patient presents for follow up. Patient admits any improvement since last visit . Patient admits continuing Nexium 20 mg along with Pepcid and FD guard . He admits some breakthrough episodes , he definitely notices a difference with beef .  He denies nausea or vomiting .    Of last visit (06/17/2020) Patient presents for follow up. patient admits continuing Nexium 20 mg , Pepcid 20 mg as well as OTC FD Guard with decent control of symptoms . He admits a few breakthrough episodes since last visit . Patient denies nausea or vomiting . He denies abdominal pain . He admits overall improvement since last visit.     Of Last visit (05/20/2020) patient presents for heartburn consult. Patient states he has been experiencing symptoms for several months. Patient admits currently taking Nexium 40 mg,  Pepcid 40 mg, as well as Sucralfate 1 gm daily.  He reports this medication will reduce symptoms but they are beginning to get worse.   Patient describes symptoms as a burning and gnawing sensation located in his stomach, he admits that the symptoms will last throughout the entire day. He denies symptoms awake at night. He admits that symptoms are worse in the evening.    Patient admits nausea without vomiting. Patient denies any associated weight loss. Patient admits an EGD/Colonoscopy within the last 2 yrs with AGA. He states he had colon polyps and esophagitis. He was told to repeat in 3-5 years.  Patient reports he had a GES ( gastric emptying study )  completed at [place] with normal results.;

## 2023-05-15 ENCOUNTER — OFFICE VISIT (OUTPATIENT)
Dept: URBAN - METROPOLITAN AREA CLINIC 33 | Facility: CLINIC | Age: 48
End: 2023-05-15

## 2023-06-12 ENCOUNTER — OFFICE VISIT (OUTPATIENT)
Dept: URBAN - METROPOLITAN AREA CLINIC 33 | Facility: CLINIC | Age: 48
End: 2023-06-12
Payer: COMMERCIAL

## 2023-06-12 VITALS
WEIGHT: 189 LBS | DIASTOLIC BLOOD PRESSURE: 82 MMHG | BODY MASS INDEX: 25.05 KG/M2 | HEART RATE: 86 BPM | OXYGEN SATURATION: 97 % | SYSTOLIC BLOOD PRESSURE: 111 MMHG | HEIGHT: 73 IN

## 2023-06-12 DIAGNOSIS — R12 HEARTBURN: ICD-10-CM

## 2023-06-12 DIAGNOSIS — K30 FUNCTIONAL DYSPEPSIA: ICD-10-CM

## 2023-06-12 PROCEDURE — 99213 OFFICE O/P EST LOW 20 MIN: CPT | Performed by: INTERNAL MEDICINE

## 2023-06-12 RX ORDER — CARAWAY OIL/LEVOMENTHOL 25-20.75MG
AS DIRECTED CAPSULE ORAL
Status: ON HOLD | COMMUNITY

## 2023-06-12 RX ORDER — LISINOPRIL 5 MG/1
1 TABLET TABLET ORAL ONCE A DAY
Qty: 30 | Status: ACTIVE | COMMUNITY

## 2023-06-12 RX ORDER — AMITRIPTYLINE HYDROCHLORIDE 10 MG/1
1 TABLET AT BEDTIME TABLET, FILM COATED ORAL ONCE A DAY
Qty: 90 TABLET | Refills: 1 | OUTPATIENT
Start: 2023-06-12

## 2023-06-12 RX ORDER — FAMOTIDINE 10 MG/1
1 TABLET TABLET, FILM COATED ORAL TWICE A DAY
Qty: 60 TABLET | Refills: 5 | Status: ACTIVE | COMMUNITY
Start: 2021-01-21

## 2023-06-12 RX ORDER — INSULIN HUMAN 100 [IU]/ML
AS DIRECTED INJECTION, SUSPENSION SUBCUTANEOUS
Status: ACTIVE | COMMUNITY

## 2023-06-12 RX ORDER — AMITRIPTYLINE HYDROCHLORIDE 10 MG/1
1 TABLET AT BEDTIME TABLET, FILM COATED ORAL ONCE A DAY
Qty: 30 TABLET | Refills: 2 | Status: ACTIVE | COMMUNITY
Start: 2021-01-21

## 2023-06-12 RX ORDER — AMITRIPTYLINE HYDROCHLORIDE 10 MG/1
1 TABLET AT BEDTIME TABLET, FILM COATED ORAL ONCE A DAY
Qty: 90 | Refills: 0 | Status: ACTIVE | COMMUNITY
Start: 2022-10-25

## 2023-06-12 RX ORDER — ESOMEPRAZOLE MAGNESIUM 20 MG/1
1 CAPSULE CAPSULE, DELAYED RELEASE ORAL ONCE A DAY
Qty: 90 CAPSULE | Refills: 1 | Status: ON HOLD | COMMUNITY
Start: 2020-11-04

## 2023-06-12 NOTE — HPI-HEARTBURN
Patient presents for follow up of heartburn. Patient states symptoms are stable. Patient admits the continuation of Famotidine and Amitriptyline for symptoms with relief. He denies associated nausea/vomiting or dysphagia.   As of last visit (10/25/2022) Patient presents for follow up of EGD and Heartburn. Patient admits he has continued to take Famotidine and Amitriptyline for management of symptoms . He had EGD completed on 10/04 which he denies any complications . Patient denies any breakthrough episodes .     Of last visit (07/21/2022) 46 year old patient presents for follow up of heartburn. He admits symptoms improved since last visit. He admits continuing Famotidine 10 mg and Amitriptyline 10 mg QHS with relief of symptoms. He denies nausea or vomiting.   Of last visit (01/20/22) Patient presents for follow up of heartburn . he admits /denies improvement since last visit . He admits continuing Famotidine 10mg BID and Amitriptyline 10mg QHS . Patient states that he seldom has heartburn and states that he believes it is diet related.  Patient states that the Amitriptyline has been helping with his sleep and anxiety. He is not sure if it has helped with his bowel habits.  Of last visit (07/21/21) Patient presents for follow up of heartburn . He admits  continuing Famotidine 10mg BID and Amitriptyline 10mg qhs  since last visit . He denies any current symptoms    Of last visit (01/21/2021) Patient present today for follow up of heartburn. Admits to occasional heartburn since his last visit. Patient denies continuing Nexium 20 mg along with Pepcid denies taking FD guard. He admits some breakthrough episodes, he definitely notices a difference with spicy red peppers. Denies vomiting, admits to occasional nausea if his blood pressure gets to low.  Admits to taking a OTC version of FD Guard at [facility].    Of last visit (07/ 24/2020)  Patient presents for follow up. Patient admits any improvement since last visit . Patient admits continuing Nexium 20 mg along with Pepcid and FD guard . He admits some breakthrough episodes , he definitely notices a difference with beef .  He denies nausea or vomiting .    Of last visit (06/17/2020) Patient presents for follow up. patient admits continuing Nexium 20 mg , Pepcid 20 mg as well as OTC FD Guard with decent control of symptoms . He admits a few breakthrough episodes since last visit . Patient denies nausea or vomiting . He denies abdominal pain . He admits overall improvement since last visit.     Of Last visit (05/20/2020) patient presents for heartburn consult. Patient states he has been experiencing symptoms for several months. Patient admits currently taking Nexium 40 mg,  Pepcid 40 mg, as well as Sucralfate 1 gm daily.  He reports this medication will reduce symptoms but they are beginning to get worse.   Patient describes symptoms as a burning and gnawing sensation located in his stomach, he admits that the symptoms will last throughout the entire day. He denies symptoms awake at night. He admits that symptoms are worse in the evening.    Patient admits nausea without vomiting. Patient denies any associated weight loss. Patient admits an EGD/Colonoscopy within the last 2 yrs with AGA. He states he had colon polyps and esophagitis. He was told to repeat in 3-5 years.  Patient reports he had a GES ( gastric emptying study )  completed at [place] with normal results.;

## 2023-12-11 ENCOUNTER — WEB ENCOUNTER (OUTPATIENT)
Dept: URBAN - METROPOLITAN AREA CLINIC 33 | Facility: CLINIC | Age: 48
End: 2023-12-11

## 2023-12-11 RX ORDER — AMITRIPTYLINE HYDROCHLORIDE 10 MG/1
1 TABLET AT BEDTIME TABLET, FILM COATED ORAL ONCE A DAY
Qty: 90 TABLET | Refills: 1
Start: 2023-06-12

## 2023-12-15 ENCOUNTER — DASHBOARD ENCOUNTERS (OUTPATIENT)
Age: 48
End: 2023-12-15

## 2023-12-15 ENCOUNTER — OFFICE VISIT (OUTPATIENT)
Dept: URBAN - METROPOLITAN AREA CLINIC 35 | Facility: CLINIC | Age: 48
End: 2023-12-15
Payer: COMMERCIAL

## 2023-12-15 VITALS
WEIGHT: 186 LBS | OXYGEN SATURATION: 98 % | BODY MASS INDEX: 24.65 KG/M2 | HEART RATE: 86 BPM | SYSTOLIC BLOOD PRESSURE: 124 MMHG | DIASTOLIC BLOOD PRESSURE: 78 MMHG | HEIGHT: 73 IN

## 2023-12-15 DIAGNOSIS — K30 FUNCTIONAL DYSPEPSIA: ICD-10-CM

## 2023-12-15 DIAGNOSIS — R12 HEARTBURN: ICD-10-CM

## 2023-12-15 DIAGNOSIS — F43.0 ACUTE STRESS REACTION: ICD-10-CM

## 2023-12-15 DIAGNOSIS — Z86.010 PERSONAL HISTORY OF COLONIC POLYPS: ICD-10-CM

## 2023-12-15 PROCEDURE — 99213 OFFICE O/P EST LOW 20 MIN: CPT | Performed by: INTERNAL MEDICINE

## 2023-12-15 RX ORDER — AMITRIPTYLINE HYDROCHLORIDE 10 MG/1
1 TABLET AT BEDTIME TABLET, FILM COATED ORAL ONCE A DAY
Qty: 90 TABLET | Refills: 1 | Status: ACTIVE | COMMUNITY
Start: 2023-06-12

## 2023-12-15 RX ORDER — ESOMEPRAZOLE MAGNESIUM 20 MG/1
1 CAPSULE CAPSULE, DELAYED RELEASE ORAL ONCE A DAY
Qty: 90 CAPSULE | Refills: 1 | Status: ON HOLD | COMMUNITY
Start: 2020-11-04

## 2023-12-15 RX ORDER — CARAWAY OIL/LEVOMENTHOL 25-20.75MG
AS DIRECTED CAPSULE ORAL
Status: ON HOLD | COMMUNITY

## 2023-12-15 RX ORDER — FAMOTIDINE 10 MG/1
1 TABLET TABLET, FILM COATED ORAL TWICE A DAY
Qty: 60 TABLET | Refills: 5 | Status: ACTIVE | COMMUNITY
Start: 2021-01-21

## 2023-12-15 RX ORDER — LISINOPRIL 5 MG/1
1 TABLET TABLET ORAL ONCE A DAY
Qty: 30 | Status: ACTIVE | COMMUNITY

## 2023-12-15 RX ORDER — INSULIN HUMAN 100 [IU]/ML
AS DIRECTED INJECTION, SUSPENSION SUBCUTANEOUS
Status: DISCONTINUED | COMMUNITY

## 2023-12-15 RX ORDER — AMITRIPTYLINE HYDROCHLORIDE 10 MG/1
1 TABLET AT BEDTIME TABLET, FILM COATED ORAL ONCE A DAY
Qty: 90 TABLET | Refills: 2 | OUTPATIENT
Start: 2023-12-15

## 2023-12-15 NOTE — HPI-HEARTBURN
Patient presents fo follow up of heartburn . Patient admits famotidine and amitriptyline since last visit . he admits symptoms are stable since last visit .  Patient denies nausea or vomiting . He denies abdominal pain .    Of last visit (06/12/2023) Patient presents for follow up of heartburn. Patient states symptoms are stable. Patient admits the continuation of Famotidine and Amitriptyline for symptoms with relief. He denies associated nausea/vomiting or dysphagia.   As of last visit (10/25/2022) Patient presents for follow up of EGD and Heartburn. Patient admits he has continued to take Famotidine and Amitriptyline for management of symptoms . He had EGD completed on 10/04 which he denies any complications . Patient denies any breakthrough episodes .     Of last visit (07/21/2022) 46 year old patient presents for follow up of heartburn. He admits symptoms improved since last visit. He admits continuing Famotidine 10 mg and Amitriptyline 10 mg QHS with relief of symptoms. He denies nausea or vomiting.   Of last visit (01/20/22) Patient presents for follow up of heartburn . he admits /denies improvement since last visit . He admits continuing Famotidine 10mg BID and Amitriptyline 10mg QHS . Patient states that he seldom has heartburn and states that he believes it is diet related.  Patient states that the Amitriptyline has been helping with his sleep and anxiety. He is not sure if it has helped with his bowel habits.  Of last visit (07/21/21) Patient presents for follow up of heartburn . He admits  continuing Famotidine 10mg BID and Amitriptyline 10mg qhs  since last visit . He denies any current symptoms    Of last visit (01/21/2021) Patient present today for follow up of heartburn. Admits to occasional heartburn since his last visit. Patient denies continuing Nexium 20 mg along with Pepcid denies taking FD guard. He admits some breakthrough episodes, he definitely notices a difference with spicy red peppers. Denies vomiting, admits to occasional nausea if his blood pressure gets to low.  Admits to taking a OTC version of FD Guard at [facility].    Of last visit (07/ 24/2020)  Patient presents for follow up. Patient admits any improvement since last visit . Patient admits continuing Nexium 20 mg along with Pepcid and FD guard . He admits some breakthrough episodes , he definitely notices a difference with beef .  He denies nausea or vomiting .    Of last visit (06/17/2020) Patient presents for follow up. patient admits continuing Nexium 20 mg , Pepcid 20 mg as well as OTC FD Guard with decent control of symptoms . He admits a few breakthrough episodes since last visit . Patient denies nausea or vomiting . He denies abdominal pain . He admits overall improvement since last visit.     Of Last visit (05/20/2020) patient presents for heartburn consult. Patient states he has been experiencing symptoms for several months. Patient admits currently taking Nexium 40 mg,  Pepcid 40 mg, as well as Sucralfate 1 gm daily.  He reports this medication will reduce symptoms but they are beginning to get worse.   Patient describes symptoms as a burning and gnawing sensation located in his stomach, he admits that the symptoms will last throughout the entire day. He denies symptoms awake at night. He admits that symptoms are worse in the evening.    Patient admits nausea without vomiting. Patient denies any associated weight loss. Patient admits an EGD/Colonoscopy within the last 2 yrs with AGA. He states he had colon polyps and esophagitis. He was told to repeat in 3-5 years.  Patient reports he had a GES ( gastric emptying study )  completed at [place] with normal results.;

## 2024-09-20 ENCOUNTER — OFFICE VISIT (OUTPATIENT)
Dept: URBAN - METROPOLITAN AREA CLINIC 35 | Facility: CLINIC | Age: 49
End: 2024-09-20
Payer: COMMERCIAL

## 2024-09-20 VITALS
HEIGHT: 73 IN | OXYGEN SATURATION: 97 % | WEIGHT: 188 LBS | BODY MASS INDEX: 24.92 KG/M2 | SYSTOLIC BLOOD PRESSURE: 130 MMHG | HEART RATE: 87 BPM | DIASTOLIC BLOOD PRESSURE: 82 MMHG

## 2024-09-20 DIAGNOSIS — R12 HEARTBURN: ICD-10-CM

## 2024-09-20 DIAGNOSIS — Z86.010 PERSONAL HISTORY OF COLONIC POLYPS: ICD-10-CM

## 2024-09-20 DIAGNOSIS — F43.0 ACUTE STRESS REACTION: ICD-10-CM

## 2024-09-20 DIAGNOSIS — E10.9 TYPE 1 DIABETES MELLITUS WITHOUT COMPLICATION: ICD-10-CM

## 2024-09-20 DIAGNOSIS — R10.13 DYSPEPSIA: ICD-10-CM

## 2024-09-20 PROBLEM — 162031009: Status: ACTIVE | Noted: 2024-09-20

## 2024-09-20 PROBLEM — 313435000: Status: ACTIVE | Noted: 2024-09-20

## 2024-09-20 PROCEDURE — 99213 OFFICE O/P EST LOW 20 MIN: CPT | Performed by: INTERNAL MEDICINE

## 2024-09-20 RX ORDER — AMITRIPTYLINE HYDROCHLORIDE 10 MG/1
1 TABLET AT BEDTIME TABLET, FILM COATED ORAL ONCE A DAY
Qty: 90 TABLET | Refills: 2 | OUTPATIENT
Start: 2024-09-20

## 2024-09-20 RX ORDER — ESOMEPRAZOLE MAGNESIUM 20 MG/1
1 CAPSULE CAPSULE, DELAYED RELEASE ORAL ONCE A DAY
Qty: 90 CAPSULE | Refills: 1 | Status: ON HOLD | COMMUNITY
Start: 2020-11-04

## 2024-09-20 RX ORDER — FAMOTIDINE 10 MG/1
1 TABLET TABLET, FILM COATED ORAL TWICE A DAY
Qty: 60 TABLET | Refills: 5 | Status: ACTIVE | COMMUNITY
Start: 2021-01-21

## 2024-09-20 RX ORDER — CARAWAY OIL/LEVOMENTHOL 25-20.75MG
AS DIRECTED CAPSULE ORAL
Status: ON HOLD | COMMUNITY

## 2024-09-20 RX ORDER — AMITRIPTYLINE HYDROCHLORIDE 10 MG/1
1 TABLET AT BEDTIME TABLET, FILM COATED ORAL ONCE A DAY
Qty: 90 TABLET | Refills: 2 | Status: ACTIVE | COMMUNITY
Start: 2023-12-15

## 2024-09-20 RX ORDER — LISINOPRIL 5 MG/1
1 TABLET TABLET ORAL ONCE A DAY
Qty: 30 | Status: ACTIVE | COMMUNITY

## 2024-09-20 NOTE — HPI-HEARTBURN
Patient presents for follow up heartburn . He admits continuing Amitriptyline 10mg and OTC famotidine BID for control of symptoms . He admits having intermittent breakthrough episodes. He admitgs increasing water and taking a Mint oil capsule which helps for additional relief .   Denies nausea /vomiting or dysphagia .    Of last visit (12/15/2023) Patient presents fo follow up of heartburn . Patient admits famotidine and amitriptyline since last visit . he admits symptoms are stable since last visit .  Patient denies nausea or vomiting . He denies abdominal pain .    Of last visit (06/12/2023) Patient presents for follow up of heartburn. Patient states symptoms are stable. Patient admits the continuation of Famotidine and Amitriptyline for symptoms with relief. He denies associated nausea/vomiting or dysphagia.   As of last visit (10/25/2022) Patient presents for follow up of EGD and Heartburn. Patient admits he has continued to take Famotidine and Amitriptyline for management of symptoms . He had EGD completed on 10/04 which he denies any complications . Patient denies any breakthrough episodes .     Of last visit (07/21/2022) 46 year old patient presents for follow up of heartburn. He admits symptoms improved since last visit. He admits continuing Famotidine 10 mg and Amitriptyline 10 mg QHS with relief of symptoms. He denies nausea or vomiting.   Of last visit (01/20/22) Patient presents for follow up of heartburn . he admits /denies improvement since last visit . He admits continuing Famotidine 10mg BID and Amitriptyline 10mg QHS . Patient states that he seldom has heartburn and states that he believes it is diet related.  Patient states that the Amitriptyline has been helping with his sleep and anxiety. He is not sure if it has helped with his bowel habits.  Of last visit (07/21/21) Patient presents for follow up of heartburn . He admits  continuing Famotidine 10mg BID and Amitriptyline 10mg qhs  since last visit . He denies any current symptoms    Of last visit (01/21/2021) Patient present today for follow up of heartburn. Admits to occasional heartburn since his last visit. Patient denies continuing Nexium 20 mg along with Pepcid denies taking FD guard. He admits some breakthrough episodes, he definitely notices a difference with spicy red peppers. Denies vomiting, admits to occasional nausea if his blood pressure gets to low.  Admits to taking a OTC version of FD Guard at [facility].

## 2025-06-20 ENCOUNTER — OFFICE VISIT (OUTPATIENT)
Dept: URBAN - METROPOLITAN AREA CLINIC 35 | Facility: CLINIC | Age: 50
End: 2025-06-20
Payer: COMMERCIAL

## 2025-06-20 DIAGNOSIS — Z09 ENCOUNTER FOR FOLLOW-UP: ICD-10-CM

## 2025-06-20 DIAGNOSIS — R10.13 DYSPEPSIA: ICD-10-CM

## 2025-06-20 DIAGNOSIS — Z86.0100 PERSONAL HISTORY OF COLON POLYPS, UNSPECIFIED: ICD-10-CM

## 2025-06-20 DIAGNOSIS — R12 HEARTBURN: ICD-10-CM

## 2025-06-20 DIAGNOSIS — E10.9 TYPE 1 DIABETES MELLITUS WITHOUT COMPLICATION: ICD-10-CM

## 2025-06-20 PROBLEM — 428283002: Status: ACTIVE | Noted: 2025-06-20

## 2025-06-20 PROCEDURE — 99213 OFFICE O/P EST LOW 20 MIN: CPT | Performed by: INTERNAL MEDICINE

## 2025-06-20 RX ORDER — ESOMEPRAZOLE MAGNESIUM 20 MG/1
1 CAPSULE CAPSULE, DELAYED RELEASE ORAL ONCE A DAY
Qty: 90 CAPSULE | Refills: 1 | Status: ON HOLD | COMMUNITY
Start: 2020-11-04

## 2025-06-20 RX ORDER — LISINOPRIL 5 MG/1
1 TABLET TABLET ORAL ONCE A DAY
Qty: 30 | Status: ACTIVE | COMMUNITY

## 2025-06-20 RX ORDER — AMITRIPTYLINE HYDROCHLORIDE 10 MG/1
1 TABLET AT BEDTIME TABLET, FILM COATED ORAL ONCE A DAY
Qty: 90 TABLET | Refills: 2 | Status: ON HOLD | COMMUNITY
Start: 2023-12-15

## 2025-06-20 RX ORDER — CARAWAY OIL/LEVOMENTHOL 25-20.75MG
AS DIRECTED CAPSULE ORAL
Status: ON HOLD | COMMUNITY

## 2025-06-20 RX ORDER — FAMOTIDINE 10 MG/1
1 TABLET TABLET, FILM COATED ORAL TWICE A DAY
Qty: 60 TABLET | Refills: 5 | Status: ON HOLD | COMMUNITY
Start: 2021-01-21

## 2025-06-20 RX ORDER — AMITRIPTYLINE HYDROCHLORIDE 10 MG/1
1 TABLET AT BEDTIME TABLET, FILM COATED ORAL ONCE A DAY
Qty: 90 TABLET | Refills: 2 | Status: ON HOLD | COMMUNITY
Start: 2024-09-20

## 2025-06-20 NOTE — HPI-HEARTBURN
49 year old male patient presents for follow up. He denies continuing amitriptyline 10mg and famotidine BID. He states he stopped the medications two weeks ago and has not had any breakthrough episodes since. Prior to stopping medications, symptoms were controlled. Since stopping medications, he states he has been eating a regular diet but has cut out Iced Tea completely, which he observed has been helpful in controlling symptoms.. He denies nausea or vomiting. Denies breakthrough episodes.  CARDIO LABS (03/13/2025) Lipid Panel: LDL - 129 H, Non HDL - 145 H, All other values WNL Hepatic Function Panel - WNL Creatine Kinase - WNL  OUTSIDE LABS (12/12/2024) A1c - 7.2 H CMP - WNL  PCP LABS (10/22/2024) CBC w/DIFF/PLT - WNL CMP: Glucose - 60 L, Creatinine - 1.43 H, All other values WNL Lipid Panel: LDL - 111 H, Non HDL - 133 H, All other values WNL A1c - 7.3 H TSH - WNL PSA - WNL  Of last visit (09/20/2024) Patient presents for follow up heartburn . He admits continuing Amitriptyline 10mg and OTC famotidine BID for control of symptoms . He admits having intermittent breakthrough episodes. He admitgs increasing water and taking a Mint oil capsule which helps for additional relief .   Denies nausea /vomiting or dysphagia .    Of last visit (12/15/2023) Patient presents fo follow up of heartburn . Patient admits famotidine and amitriptyline since last visit . he admits symptoms are stable since last visit .  Patient denies nausea or vomiting . He denies abdominal pain .    Of last visit (06/12/2023) Patient presents for follow up of heartburn. Patient states symptoms are stable. Patient admits the continuation of Famotidine and Amitriptyline for symptoms with relief. He denies associated nausea/vomiting or dysphagia.   As of last visit (10/25/2022) Patient presents for follow up of EGD and Heartburn. Patient admits he has continued to take Famotidine and Amitriptyline for management of symptoms . He had EGD completed on 10/04 which he denies any complications . Patient denies any breakthrough episodes .     Of last visit (07/21/2022) 46 year old patient presents for follow up of heartburn. He admits symptoms improved since last visit. He admits continuing Famotidine 10 mg and Amitriptyline 10 mg QHS with relief of symptoms. He denies nausea or vomiting.   Of last visit (01/20/22) Patient presents for follow up of heartburn . he admits /denies improvement since last visit . He admits continuing Famotidine 10mg BID and Amitriptyline 10mg QHS . Patient states that he seldom has heartburn and states that he believes it is diet related.  Patient states that the Amitriptyline has been helping with his sleep and anxiety. He is not sure if it has helped with his bowel habits.  Of last visit (07/21/21) Patient presents for follow up of heartburn . He admits  continuing Famotidine 10mg BID and Amitriptyline 10mg qhs  since last visit . He denies any current symptoms    Of last visit (01/21/2021) Patient present today for follow up of heartburn. Admits to occasional heartburn since his last visit. Patient denies continuing Nexium 20 mg along with Pepcid denies taking FD guard. He admits some breakthrough episodes, he definitely notices a difference with spicy red peppers. Denies vomiting, admits to occasional nausea if his blood pressure gets to low.  Admits to taking a OTC version of FD Guard at [facility].

## 2025-07-30 ENCOUNTER — WEB ENCOUNTER (OUTPATIENT)
Dept: URBAN - METROPOLITAN AREA CLINIC 35 | Facility: CLINIC | Age: 50
End: 2025-07-30

## 2025-07-30 RX ORDER — AMITRIPTYLINE HYDROCHLORIDE 10 MG/1
1 TABLET AT BEDTIME TABLET, FILM COATED ORAL ONCE A DAY
Qty: 90 TABLET | Refills: 3 | OUTPATIENT
Start: 2025-07-30